# Patient Record
Sex: FEMALE | Race: WHITE | URBAN - NONMETROPOLITAN AREA
[De-identification: names, ages, dates, MRNs, and addresses within clinical notes are randomized per-mention and may not be internally consistent; named-entity substitution may affect disease eponyms.]

---
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
## 2023-01-04 ENCOUNTER — TELEPHONE ENCOUNTER (OUTPATIENT)
Dept: URBAN - METROPOLITAN AREA CLINIC 92 | Facility: CLINIC | Age: 18
End: 2023-01-04

## 2023-01-06 ENCOUNTER — WEB ENCOUNTER (OUTPATIENT)
Dept: URBAN - METROPOLITAN AREA CLINIC 92 | Facility: CLINIC | Age: 18
End: 2023-01-06

## 2023-01-10 ENCOUNTER — OFFICE VISIT (OUTPATIENT)
Dept: URBAN - METROPOLITAN AREA TELEHEALTH 2 | Facility: TELEHEALTH | Age: 18
End: 2023-01-10
Payer: COMMERCIAL

## 2023-01-10 ENCOUNTER — TELEPHONE ENCOUNTER (OUTPATIENT)
Dept: URBAN - METROPOLITAN AREA CLINIC 5 | Facility: CLINIC | Age: 18
End: 2023-01-10

## 2023-01-10 VITALS — HEIGHT: 64 IN | BODY MASS INDEX: 21.34 KG/M2 | WEIGHT: 125 LBS

## 2023-01-10 DIAGNOSIS — K20.0 EOSINOPHILIC ESOPHAGITIS: ICD-10-CM

## 2023-01-10 DIAGNOSIS — R13.19 CERVICAL DYSPHAGIA: ICD-10-CM

## 2023-01-10 PROCEDURE — 99214 OFFICE O/P EST MOD 30 MIN: CPT | Performed by: INTERNAL MEDICINE

## 2023-01-10 RX ORDER — OMEPRAZOLE 40 MG/1
TAKE ONE CAPSULE BY MOUTH TWICE A DAY CAPSULE, DELAYED RELEASE ORAL
Qty: 60 UNSPECIFIED | Refills: 1 | Status: ON HOLD | COMMUNITY

## 2023-01-10 RX ORDER — PANTOPRAZOLE SODIUM 40 MG/1
1 TABLET TABLET, DELAYED RELEASE ORAL ONCE A DAY
Qty: 30 | Refills: 3 | Status: DISCONTINUED | COMMUNITY
Start: 2022-11-29

## 2023-01-10 RX ORDER — LANSOPRAZOLE 15 MG/1
1 CAPSULE BEFORE A MEAL CAPSULE, DELAYED RELEASE ORAL
Qty: 60 | Refills: 6 | OUTPATIENT
Start: 2023-01-10

## 2023-01-10 NOTE — HPI-TODAY'S VISIT:
17yoF seen in 2022 with mild intermittent non-progressive dysphagia to solids (though per patient this is not her biggest concern). Denied chest pain, wt loss, n/v, gi bleeding but notes post-prandial cough, phlegm in her throat and nasal cavities and senstation of her throat closing.  EGD in May 2022 with furrows in entire esophagus ow normal , bx increased eosinophils >30 in mid and distal esophagus, no HP. She was on omeprazole for months with improvement in sx but then started to get blurry vision so stopped PPI and noted a post-prandial cough and feeling of throat closing, worse with dairy but occurs with all po intake. Started pantoprazole but on this had HA so stopped the medications about a week ago, HA persisted for 5 days and now have resolved. She is seeing Dr. Bear Friday.  While on the medications she continued to have a post-prandial cough as well as a feeling of congestion in her nose and throat and phlegm in her throat, also exacerbated by eating. Sensation of throat closing has improved. She has seen ENT in the past, not sure of findings Brother with EoE.

## 2023-02-03 ENCOUNTER — OFFICE VISIT (OUTPATIENT)
Dept: URBAN - METROPOLITAN AREA TELEHEALTH 2 | Facility: TELEHEALTH | Age: 18
End: 2023-02-03

## 2023-02-21 ENCOUNTER — OFFICE VISIT (OUTPATIENT)
Dept: URBAN - METROPOLITAN AREA TELEHEALTH 2 | Facility: TELEHEALTH | Age: 18
End: 2023-02-21
Payer: COMMERCIAL

## 2023-02-21 ENCOUNTER — TELEPHONE ENCOUNTER (OUTPATIENT)
Dept: URBAN - METROPOLITAN AREA CLINIC 5 | Facility: CLINIC | Age: 18
End: 2023-02-21

## 2023-02-21 VITALS — BODY MASS INDEX: 21.34 KG/M2 | WEIGHT: 125 LBS | HEIGHT: 64 IN

## 2023-02-21 DIAGNOSIS — K20.90 ACUTE ESOPHAGITIS: ICD-10-CM

## 2023-02-21 DIAGNOSIS — R13.19 CERVICAL DYSPHAGIA: ICD-10-CM

## 2023-02-21 PROBLEM — 235599003: Status: ACTIVE | Noted: 2022-11-29

## 2023-02-21 PROBLEM — 40739000: Status: ACTIVE | Noted: 2022-03-29

## 2023-02-21 PROCEDURE — 99213 OFFICE O/P EST LOW 20 MIN: CPT | Performed by: INTERNAL MEDICINE

## 2023-02-21 RX ORDER — OMEPRAZOLE 40 MG/1
TAKE ONE CAPSULE BY MOUTH TWICE A DAY CAPSULE, DELAYED RELEASE ORAL
Qty: 60 UNSPECIFIED | Refills: 1 | Status: DISCONTINUED | COMMUNITY

## 2023-02-21 RX ORDER — LANSOPRAZOLE 15 MG/1
1 CAPSULE BEFORE A MEAL CAPSULE, DELAYED RELEASE ORAL
Qty: 60 | Refills: 6 | OUTPATIENT

## 2023-02-21 RX ORDER — LANSOPRAZOLE 15 MG/1
1 CAPSULE BEFORE A MEAL CAPSULE, DELAYED RELEASE ORAL
Qty: 60 | Refills: 6 | Status: ACTIVE | COMMUNITY
Start: 2023-01-10

## 2023-02-21 NOTE — HPI-TODAY'S VISIT:
17yoF presents for f/u of EOE.  She was seen in 2022 with mild intermittent non-progressive dysphagia to solids (though per patient this is not her biggest concern) and post-prandial cough with phlegm in her throat and nasal cavity causing sensation of throat closing. Denied chest pain, wt loss, n/v, gi bleeding. EGD in May 2022 with furrows in entire esophagus ow normal , bx increased eosinophils >30 in mid and distal esophagus, no HP. She was on omeprazole for months with improvement in sx but then started to get blurry vision so stopped PPI and noted a post-prandial cough and feeling of throat closing, worse with dairy but occurs with all po intake. Started pantoprazole but on this had HA so stopped this and given lansopazole 15mg BID and referred to [facility] who rec avoid shellfish (+ skin test) which she is doing. She notes despite BID PPI she continues to feel AM cough as well as some phlegm in her throat but no dysphagia or odynophagia and choking/throat closing has resolved. She saw ENT Dr. dash and had a neg laryngoscope and was given flonase for nasal congestion. No classic GERD sx at this time Brother with EoE

## 2023-05-05 ENCOUNTER — DASHBOARD ENCOUNTERS (OUTPATIENT)
Age: 18
End: 2023-05-05

## 2023-05-12 ENCOUNTER — OFFICE VISIT (OUTPATIENT)
Dept: URBAN - METROPOLITAN AREA TELEHEALTH 2 | Facility: TELEHEALTH | Age: 18
End: 2023-05-12

## 2023-05-12 VITALS — HEIGHT: 64 IN | BODY MASS INDEX: 21.34 KG/M2 | WEIGHT: 125 LBS

## 2023-05-12 RX ORDER — LANSOPRAZOLE 15 MG/1
1 CAPSULE BEFORE A MEAL CAPSULE, DELAYED RELEASE ORAL
Qty: 60 | Refills: 6 | OUTPATIENT

## 2023-05-12 RX ORDER — LANSOPRAZOLE 15 MG/1
1 CAPSULE BEFORE A MEAL CAPSULE, DELAYED RELEASE ORAL
Qty: 60 | Refills: 6 | Status: ACTIVE | COMMUNITY

## 2023-05-12 NOTE — HPI-TODAY'S VISIT:
18yoF presents for f/u of EOE.  She was seen in 2022 with mild intermittent non-progressive dysphagia to solids (though per patient this is not her biggest concern) and post-prandial cough with phlegm in her throat and nasal cavity causing sensation of throat closing. Denied chest pain, wt loss, n/v, gi bleeding. EGD in May 2022 with furrows in entire esophagus ow normal , bx increased eosinophils >30 in mid and distal esophagus, no HP. She was on omeprazole for months with improvement in sx but then started to get blurry vision so stopped PPI and noted a post-prandial cough and feeling of throat closing, worse with dairy but occurs with all po intake. Started pantoprazole but on this had HA so stopped this and given lansopazole 15mg BID and referred to [facility] who rec avoid shellfish (+ skin test) which she is doing. She notes despite BID PPI she continues to feel AM cough as well as some phlegm in her throat but no dysphagia or odynophagia and choking/throat closing has resolved. She saw ENT Dr. dash and had a neg laryngoscope and was given flonase for nasal congestion. No classic GERD sx at this time Brother with EoE

## 2024-09-06 NOTE — PHYSICAL EXAM PSYCH:
Addended by: CHANDRIKA MERRITT on: 9/6/2024 02:20 PM     Modules accepted: Orders     normal mood with appropriate affect

## 2025-06-11 ENCOUNTER — APPOINTMENT (OUTPATIENT)
Dept: URBAN - NONMETROPOLITAN AREA CLINIC 45 | Age: 20
Setting detail: DERMATOLOGY
End: 2025-06-15

## 2025-06-11 DIAGNOSIS — L56.2 PHOTOCONTACT DERMATITIS [BERLOQUE DERMATITIS]: ICD-10-CM

## 2025-06-11 DIAGNOSIS — L91.0 HYPERTROPHIC SCAR: ICD-10-CM

## 2025-06-11 PROCEDURE — OTHER PRESCRIPTION MEDICATION MANAGEMENT: OTHER

## 2025-06-11 PROCEDURE — OTHER PRESCRIPTION: OTHER

## 2025-06-11 PROCEDURE — OTHER MIPS QUALITY: OTHER

## 2025-06-11 PROCEDURE — OTHER COUNSELING: OTHER

## 2025-06-11 PROCEDURE — OTHER INTRALESIONAL KENALOG: OTHER

## 2025-06-11 RX ORDER — TRIAMCINOLONE ACETONIDE 1 MG/G
APPLY CREAM TOPICAL AS DIRECTED
Qty: 30 | Refills: 0 | Status: ERX | COMMUNITY
Start: 2025-06-11

## 2025-06-11 ASSESSMENT — LOCATION SIMPLE DESCRIPTION DERM
LOCATION SIMPLE: LEFT HAND
LOCATION SIMPLE: RIGHT HAND
LOCATION SIMPLE: LEFT RING FINGER
LOCATION SIMPLE: LEFT MIDDLE FINGER
LOCATION SIMPLE: LEFT INDEX FINGER

## 2025-06-11 ASSESSMENT — LOCATION DETAILED DESCRIPTION DERM
LOCATION DETAILED: LEFT INDEX FINGER PROXIMAL INTERPHALANGEAL JOINT
LOCATION DETAILED: RIGHT RADIAL DORSAL HAND
LOCATION DETAILED: LEFT RADIAL DORSAL HAND
LOCATION DETAILED: LEFT PROXIMAL DORSAL MIDDLE FINGER
LOCATION DETAILED: LEFT RING FINGER PROXIMAL INTERPHALANGEAL JOINT

## 2025-06-11 ASSESSMENT — LOCATION ZONE DERM
LOCATION ZONE: HAND
LOCATION ZONE: FINGER